# Patient Record
Sex: MALE | Race: BLACK OR AFRICAN AMERICAN | Employment: OTHER | URBAN - METROPOLITAN AREA
[De-identification: names, ages, dates, MRNs, and addresses within clinical notes are randomized per-mention and may not be internally consistent; named-entity substitution may affect disease eponyms.]

---

## 2019-01-14 NOTE — H&P
52 y.o. male for open access EGD for GERD. Additional data for completion of the targeted pre-endoscopy H&P will be provided under 'H&P interval notes'. Please see that document which will be attached to this.  
David Caballero MD

## 2019-01-15 ENCOUNTER — HOSPITAL ENCOUNTER (OUTPATIENT)
Age: 54
Setting detail: OUTPATIENT SURGERY
Discharge: HOME | End: 2019-01-15
Attending: SPECIALIST | Admitting: SPECIALIST
Payer: OTHER GOVERNMENT

## 2019-01-15 ENCOUNTER — ANESTHESIA (OUTPATIENT)
Dept: ENDOSCOPY | Age: 54
End: 2019-01-15
Payer: OTHER GOVERNMENT

## 2019-01-15 ENCOUNTER — ANESTHESIA EVENT (OUTPATIENT)
Dept: ENDOSCOPY | Age: 54
End: 2019-01-15
Payer: OTHER GOVERNMENT

## 2019-01-15 VITALS
SYSTOLIC BLOOD PRESSURE: 134 MMHG | WEIGHT: 174 LBS | RESPIRATION RATE: 16 BRPM | OXYGEN SATURATION: 100 % | DIASTOLIC BLOOD PRESSURE: 91 MMHG | HEART RATE: 102 BPM | HEIGHT: 66 IN | TEMPERATURE: 98.6 F | BODY MASS INDEX: 27.97 KG/M2

## 2019-01-15 PROCEDURE — 76040000019: Performed by: SPECIALIST

## 2019-01-15 PROCEDURE — 74011250636 HC RX REV CODE- 250/636: Performed by: PHYSICIAN ASSISTANT

## 2019-01-15 PROCEDURE — 77030009426 HC FCPS BIOP ENDOSC BSC -B: Performed by: SPECIALIST

## 2019-01-15 PROCEDURE — 88305 TISSUE EXAM BY PATHOLOGIST: CPT

## 2019-01-15 PROCEDURE — 76060000031 HC ANESTHESIA FIRST 0.5 HR: Performed by: SPECIALIST

## 2019-01-15 PROCEDURE — 74011250636 HC RX REV CODE- 250/636

## 2019-01-15 RX ORDER — GUAIFENESIN 600 MG/1
600 TABLET, EXTENDED RELEASE ORAL 2 TIMES DAILY
COMMUNITY

## 2019-01-15 RX ORDER — BUPROPION HYDROCHLORIDE 150 MG/1
150 TABLET ORAL
COMMUNITY

## 2019-01-15 RX ORDER — RANITIDINE 150 MG/1
150 CAPSULE ORAL 2 TIMES DAILY
COMMUNITY

## 2019-01-15 RX ORDER — SODIUM CHLORIDE 9 MG/ML
50 INJECTION, SOLUTION INTRAVENOUS CONTINUOUS
Status: DISCONTINUED | OUTPATIENT
Start: 2019-01-15 | End: 2019-01-15 | Stop reason: HOSPADM

## 2019-01-15 RX ORDER — MIRTAZAPINE 30 MG/1
TABLET, FILM COATED ORAL
COMMUNITY

## 2019-01-15 RX ORDER — PROPOFOL 10 MG/ML
INJECTION, EMULSION INTRAVENOUS
Status: DISCONTINUED | OUTPATIENT
Start: 2019-01-15 | End: 2019-01-15 | Stop reason: HOSPADM

## 2019-01-15 RX ORDER — LISINOPRIL AND HYDROCHLOROTHIAZIDE 12.5; 2 MG/1; MG/1
TABLET ORAL DAILY
COMMUNITY

## 2019-01-15 RX ORDER — MEGESTROL ACETATE 20 MG/1
TABLET ORAL DAILY
COMMUNITY

## 2019-01-15 RX ORDER — LIDOCAINE 50 MG/G
PATCH TOPICAL EVERY 24 HOURS
COMMUNITY

## 2019-01-15 RX ORDER — SILDENAFIL 100 MG/1
100 TABLET, FILM COATED ORAL AS NEEDED
COMMUNITY

## 2019-01-15 RX ORDER — ROSUVASTATIN CALCIUM 20 MG/1
20 TABLET, COATED ORAL
COMMUNITY

## 2019-01-15 RX ORDER — ALBUTEROL SULFATE 90 UG/1
AEROSOL, METERED RESPIRATORY (INHALATION)
COMMUNITY

## 2019-01-15 RX ORDER — CHLORZOXAZONE 500 MG/1
500 TABLET ORAL
COMMUNITY

## 2019-01-15 RX ORDER — PANTOPRAZOLE SODIUM 20 MG/1
20 TABLET, DELAYED RELEASE ORAL DAILY
COMMUNITY

## 2019-01-15 RX ORDER — EPINEPHRINE 0.1 MG/ML
1 INJECTION INTRACARDIAC; INTRAVENOUS
Status: DISCONTINUED | OUTPATIENT
Start: 2019-01-15 | End: 2019-01-15 | Stop reason: HOSPADM

## 2019-01-15 RX ORDER — PROPOFOL 10 MG/ML
INJECTION, EMULSION INTRAVENOUS AS NEEDED
Status: DISCONTINUED | OUTPATIENT
Start: 2019-01-15 | End: 2019-01-15 | Stop reason: HOSPADM

## 2019-01-15 RX ORDER — CARBOXYMETHYLCELLULOSE SODIUM 10 MG/ML
1 GEL OPHTHALMIC AS NEEDED
COMMUNITY

## 2019-01-15 RX ORDER — TRETINOIN 0.25 MG/G
CREAM TOPICAL
COMMUNITY

## 2019-01-15 RX ORDER — ATROPINE SULFATE 0.1 MG/ML
0.5 INJECTION INTRAVENOUS
Status: DISCONTINUED | OUTPATIENT
Start: 2019-01-15 | End: 2019-01-15 | Stop reason: HOSPADM

## 2019-01-15 RX ORDER — FLUTICASONE PROPIONATE AND SALMETEROL 250; 50 UG/1; UG/1
1 POWDER RESPIRATORY (INHALATION) EVERY 12 HOURS
COMMUNITY

## 2019-01-15 RX ORDER — DOCUSATE SODIUM 100 MG/1
100 CAPSULE, LIQUID FILLED ORAL 2 TIMES DAILY
COMMUNITY

## 2019-01-15 RX ORDER — NORTRIPTYLINE HYDROCHLORIDE 25 MG/1
CAPSULE ORAL
COMMUNITY

## 2019-01-15 RX ORDER — GABAPENTIN 300 MG/1
300 CAPSULE ORAL 3 TIMES DAILY
COMMUNITY

## 2019-01-15 RX ORDER — MELOXICAM 15 MG/1
15 TABLET ORAL DAILY
COMMUNITY

## 2019-01-15 RX ORDER — FENTANYL CITRATE 50 UG/ML
25 INJECTION, SOLUTION INTRAMUSCULAR; INTRAVENOUS AS NEEDED
Status: DISCONTINUED | OUTPATIENT
Start: 2019-01-15 | End: 2019-01-15 | Stop reason: HOSPADM

## 2019-01-15 RX ORDER — MAG HYDROX/ALUMINUM HYD/SIMETH 200-200-20
30 SUSPENSION, ORAL (FINAL DOSE FORM) ORAL
COMMUNITY

## 2019-01-15 RX ORDER — CETIRIZINE HCL 10 MG
TABLET ORAL
COMMUNITY

## 2019-01-15 RX ORDER — POLYETHYLENE GLYCOL 3350 17 G/17G
17 POWDER, FOR SOLUTION ORAL DAILY
COMMUNITY

## 2019-01-15 RX ORDER — DEXTROMETHORPHAN/PSEUDOEPHED 2.5-7.5/.8
1.2 DROPS ORAL
Status: DISCONTINUED | OUTPATIENT
Start: 2019-01-15 | End: 2019-01-15 | Stop reason: HOSPADM

## 2019-01-15 RX ORDER — MODAFINIL 100 MG/1
TABLET ORAL
COMMUNITY

## 2019-01-15 RX ORDER — LIDOCAINE HYDROCHLORIDE 20 MG/ML
INJECTION, SOLUTION EPIDURAL; INFILTRATION; INTRACAUDAL; PERINEURAL AS NEEDED
Status: DISCONTINUED | OUTPATIENT
Start: 2019-01-15 | End: 2019-01-15 | Stop reason: HOSPADM

## 2019-01-15 RX ORDER — DESONIDE 0.5 MG/ML
LOTION TOPICAL 2 TIMES DAILY
COMMUNITY

## 2019-01-15 RX ORDER — ERGOCALCIFEROL 1.25 MG/1
50000 CAPSULE ORAL
COMMUNITY

## 2019-01-15 RX ORDER — ZOLPIDEM TARTRATE 12.5 MG/1
12.5 TABLET, FILM COATED, EXTENDED RELEASE ORAL
COMMUNITY

## 2019-01-15 RX ORDER — MIDAZOLAM HYDROCHLORIDE 1 MG/ML
.25-5 INJECTION, SOLUTION INTRAMUSCULAR; INTRAVENOUS AS NEEDED
Status: DISCONTINUED | OUTPATIENT
Start: 2019-01-15 | End: 2019-01-15 | Stop reason: HOSPADM

## 2019-01-15 RX ORDER — FLUMAZENIL 0.1 MG/ML
0.2 INJECTION INTRAVENOUS
Status: DISCONTINUED | OUTPATIENT
Start: 2019-01-15 | End: 2019-01-15 | Stop reason: HOSPADM

## 2019-01-15 RX ORDER — ACETAMINOPHEN 500 MG
TABLET ORAL
COMMUNITY

## 2019-01-15 RX ORDER — PRAZOSIN HYDROCHLORIDE 2 MG/1
2 CAPSULE ORAL
COMMUNITY

## 2019-01-15 RX ORDER — SODIUM CHLORIDE 9 MG/ML
INJECTION, SOLUTION INTRAVENOUS
Status: DISCONTINUED | OUTPATIENT
Start: 2019-01-15 | End: 2019-01-15 | Stop reason: HOSPADM

## 2019-01-15 RX ORDER — NALOXONE HYDROCHLORIDE 0.4 MG/ML
0.4 INJECTION, SOLUTION INTRAMUSCULAR; INTRAVENOUS; SUBCUTANEOUS
Status: DISCONTINUED | OUTPATIENT
Start: 2019-01-15 | End: 2019-01-15 | Stop reason: HOSPADM

## 2019-01-15 RX ADMIN — PROPOFOL 75 MG: 10 INJECTION, EMULSION INTRAVENOUS at 09:34

## 2019-01-15 RX ADMIN — SODIUM CHLORIDE 50 ML/HR: 900 INJECTION, SOLUTION INTRAVENOUS at 09:25

## 2019-01-15 RX ADMIN — LIDOCAINE HYDROCHLORIDE 20 MG: 20 INJECTION, SOLUTION EPIDURAL; INFILTRATION; INTRACAUDAL; PERINEURAL at 09:34

## 2019-01-15 RX ADMIN — SODIUM CHLORIDE: 9 INJECTION, SOLUTION INTRAVENOUS at 08:53

## 2019-01-15 RX ADMIN — PROPOFOL 140 MCG/KG/MIN: 10 INJECTION, EMULSION INTRAVENOUS at 09:34

## 2019-01-15 NOTE — PERIOP NOTES
Endoscope was pre-cleaned at bedside immediately following procedure by [facility].

## 2019-01-15 NOTE — ANESTHESIA PREPROCEDURE EVALUATION
Anesthetic History No history of anesthetic complications Review of Systems / Medical History Patient summary reviewed and pertinent labs reviewed Pulmonary Sleep apnea: CPAP Asthma : well controlled Comments: Seasonal allergies Neuro/Psych Psychiatric history Cardiovascular Hypertension Hyperlipidemia Exercise tolerance: >4 METS 
  
GI/Hepatic/Renal 
  
GERD Endo/Other Arthritis Other Findings Physical Exam 
 
Airway Mallampati: II 
TM Distance: > 6 cm Neck ROM: normal range of motion Cardiovascular Rhythm: regular Rate: normal 
 
 
 
 Dental 
 
Dentition: Upper dentition intact and Lower dentition intact Pulmonary Breath sounds clear to auscultation Abdominal 
 
 
 
 Other Findings Anesthetic Plan ASA: 2 Anesthesia type: MAC Induction: Intravenous Anesthetic plan and risks discussed with: Patient

## 2019-01-15 NOTE — PROCEDURES
[address], [place]  [phone]      January 15, 2019    Esophagogastroduodenoscopy (EGD) Procedure Note  Dorothy Vidal  : 1965  [facility] Medical Record Number: [id number]      Indications:    GERD  Referring Physician:  Luli Melgar MD  Anesthesia/Sedation:  Conscious sedation/deep sedation/monitored anesthesia -- see notes. Endoscopist:  Dr. Tadeo Saunders  Complications:  None  Estimated Blood Loss:  None    Permit:  The indications, risks, benefits and alternatives were reviewed with the patient or their decision maker who was provided an opportunity to ask questions and all questions were answered. The specific risks of esophagogastroduodenoscopy with conscious sedation were reviewed, including but not limited to anesthetic complication, bleeding, adverse drug reaction, missed lesion, infection, IV site reactions, and intestinal perforation which would lead to the need for surgical repair. Alternatives to EGD including radiographic imaging, observation without testing, or laboratory testing were reviewed as well as the limitations of those alternatives discussed. After considering the options and having all their questions answered, the patient or their decision maker provided both verbal and written consent to proceed. Procedure in Detail:  After obtaining informed consent, positioning of the patient in the left lateral decubitus position, and conduction of a pre-procedure pause or \"time out\" the endoscope was introduced into the mouth and advanced to the duodenum. A careful inspection was made, and findings or interventions are described below. Findings:   Esophagus:normal - cold forceps biopsies taken from mid esophagus for histology   Stomach: normal   Duodenum/jejunum: normal      Specimens: See above    Impression: Completely normal EGD, biopsies taken. Recommendations:  -Await pathology. Thank you for entrusting me with this patient's care. Please do not hesitate to contact me with any questions or if I can be of assistance with any of your other patients' GI needs. Signed By: Shalonda Heller MD                        January 15, 2019     Surgical assistant none. Implants none unless specified.

## 2019-01-15 NOTE — PROGRESS NOTES
Francis Cook 1965 
[id number] Situation: 
Verbal report received from: Glen Sofia Procedure: Procedure(s): ESOPHAGOGASTRODUODENOSCOPY (EGD) ESOPHAGOGASTRODUODENAL (EGD) BIOPSY Background: 
 
Preoperative diagnosis: CHRONIC CONSTIPATION, HEARTBURN, NAUSEA Postoperative diagnosis: Normal 
 
:  Dr. Constance Franks Assistant(s): Endoscopy RN-1: Bill Wolff RN; Branden Lawson RN Specimens:  
ID Type Source Tests Collected by Time Destination 1 : biopsy Preservative Esophagus, Mid  Chely Wolf MD 1/15/2019 4688 Pathology H. Pylori  no Assessment: 
Intra-procedure medications Anesthesia gave intra-procedure sedation and medications, see anesthesia flow sheet yes Intravenous fluids: NS@ [facility] Vital signs stable yes Abdominal assessment: round and soft   yes Recommendation: 
Discharge patient per MD order  yes. Return to floor  outpatient Family or Friend   Fiance Permission to share finding with family or friend yes

## 2019-01-15 NOTE — DISCHARGE INSTRUCTIONS
[address] [facility] J CARLOS Cutler MD  [phone]      January 15, 2019     Francis Marlow  YOB: 1965    ENDOSCOPY DISCHARGE INSTRUCTIONS    If there is redness at IV site you should apply warm compress to area. If redness or soreness persist contact Dr. Maria Eugenia Cutler' or your primary care doctor. Gaseous discomfort may develop, but walking, belching will help relieve this. You may not operate a vehicle for 12 hours  You may not operate machinery or dangerous appliances for rest of today  You may not drink alcoholic beverages for 12 hours  Avoid making any critical decisions for 24 hours    DIET:  You may resume your normal diet, but some patients find that heavy or large meals may lead to indigestion or vomiting. I suggest a light meal as first food intake. MEDICATIONS:  The use of some over-the-counter pain medication may lead to bleeding after biopsies or other procedures you may have had done. Tylenol (also called acetaminophen) is safe to take and will not lead to bleeding. Based on the procedure you had today you may not safely take aspirin or aspirin-like products for the next ten (10) days. ACTIVITY:  You may resume your normal household activities, but it is recommended that you spend the remainder of the day resting -  avoid any strenuous activity. CALL DR. Anna England' OFFICE IF:  Increasing pain, nausea, vomiting  Abdominal distension (swelling)  Significant new or increased bleeding (oral or rectal)  Fever/Chills  Chest pain/shortness of breath                   Additional instructions:   No aspirin 10 days  We found that the upper GI tract is completely healthy when looked at with the eye and endoscope but we took biopsies to have the esophagus tissue examined under the microscope to see if there are any changes to explain any ongoing symptoms.   Ms Andressa Salinas or my office staff will contact you with the biopsy results when available. It was an honor to be your doctor today. Please let me or my office staff know if you have any feedback about today's procedure.     Leanna Valenzuela MD

## 2019-01-15 NOTE — INTERVAL H&P NOTE
Pre-Endoscopy H&P Update Chief complaint/HPI/ROS:  The indication for the procedure, the patient's history and the patient's current medications are reviewed prior to the procedure and that data is reported on the H&P to which this document is attached. Any significant complaints with regard to organ systems will be noted, and if not mentioned then a review of systems is not contributory. Past Medical History:  
Diagnosis Date  Arthritis  Asthma  Cancer ([facility] [address] 2011 prostate  Chronic pain   
 lower back  GERD (gastroesophageal reflux disease)  Hypertension  Psychiatric disorder PTSD  Sleep apnea   
 uses c-pap Past Surgical History:  
Procedure Laterality Date  HX ORTHOPAEDIC    
 R knee  HX OTHER SURGICAL Back surgery 3/2018  HX PROSTATECTOMY [place] vasectomy Social  
Social History Tobacco Use  Smoking status: Never Smoker  Smokeless tobacco: Never Used Substance Use Topics  Alcohol use: Yes Comment: occationally History reviewed. No pertinent family history. No Known Allergies Prior to Admission Medications Prescriptions Last Dose Informant Patient Reported? Taking? Wheat Dextrin (BENEFIBER CLEAR) 3 gram/3.5 gram pwpk 1/14/2019 at Unknown time  Yes Yes Sig: Take  by mouth. acetaminophen (TYLENOL EXTRA STRENGTH) 500 mg tablet 1/8/2019 at Unknown time  Yes Yes Sig: Take  by mouth every six (6) hours as needed for Pain. albuterol (PROVENTIL HFA, VENTOLIN HFA, PROAIR HFA) 90 mcg/actuation inhaler 1/8/2019 at Unknown time  Yes Yes Sig: Take  by inhalation. alum-mag hydroxide-simeth (MAALOX ADVANCED) 200-200-20 mg/5 mL susp 1/8/2019 at Unknown time  Yes Yes Sig: Take 30 mL by mouth every four (4) hours as needed. buPROPion XL (WELLBUTRIN XL) 150 mg tablet 1/14/2019 at Unknown time  Yes Yes Sig: Take 150 mg by mouth every morning. carboxymethylcellulose sodium (REFRESH LIQUIGEL) 1 % dlgl ophthalmic solution 1/15/2019 at Unknown time  Yes Yes Si Drop as needed. cetaphil (CETAPHIL) topical cream 2019 at Unknown time  Yes Yes Sig: Apply  to affected area as needed for Dry Skin. cetirizine (ZYRTEC) 10 mg tablet 2019 at Unknown time  Yes Yes Sig: Take  by mouth. chlorzoxazone (PARAFON FORTE) 500 mg tablet 2019 at Unknown time  Yes Yes Sig: Take 500 mg by mouth. desonide (TRIDESILON) 0.05 % topical lotion 2019 at Unknown time  Yes Yes Sig: Apply  to affected area two (2) times a day. docusate sodium (COLACE) 100 mg capsule 2019 at Unknown time  Yes Yes Sig: Take 100 mg by mouth two (2) times a day. ergocalciferol (VITAMIN D2) 50,000 unit capsule 2019 at Unknown time  Yes Yes Sig: Take 50,000 Units by mouth. fluticasone (FLOVENT DISKUS) 50 mcg/actuation inhaler 2019 at Unknown time  Yes Yes Sig: Take  by inhalation. fluticasone-salmeterol (ADVAIR DISKUS) 250-50 mcg/dose diskus inhaler 2019 at Unknown time  Yes Yes Sig: Take 1 Puff by inhalation every twelve (12) hours. gabapentin (NEURONTIN) 300 mg capsule 2019 at Unknown time  Yes Yes Sig: Take 300 mg by mouth three (3) times daily. guaiFENesin ER (MUCINEX) 600 mg ER tablet 2019 at Unknown time  Yes Yes Sig: Take 600 mg by mouth two (2) times a day. lidocaine (LIDODERM) 5 % 2019 at Unknown time  Yes Yes Sig: by TransDERmal route every twenty-four (24) hours. Apply patch to the affected area for 12 hours a day and remove for 12 hours a day. lisinopril-hydroCHLOROthiazide (PRINZIDE, ZESTORETIC) 20-12.5 mg per tablet 1/15/2019 at Unknown time  Yes Yes Sig: Take  by mouth daily. megestrol (MEGACE) 20 mg tablet 2019 at Unknown time  Yes Yes Sig: Take  by mouth daily. meloxicam (MOBIC) 15 mg tablet 2019 at Unknown time  Yes Yes Sig: Take 15 mg by mouth daily. mirtazapine (REMERON) 30 mg tablet 2019 at Unknown time  Yes Yes Sig: Take  by mouth nightly. modafinil (PROVIGIL) 100 mg tablet 2019 at Unknown time  Yes Yes Sig: Take  by mouth every morning. nortriptyline (PAMELOR) 25 mg capsule 2019 at Unknown time  Yes Yes Sig: Take  by mouth nightly. pantoprazole (PROTONIX) 20 mg tablet 2019 at Unknown time  Yes Yes Sig: Take 20 mg by mouth daily. polyethylene glycol (MIRALAX) 17 gram/dose powder 2019 at Unknown time  Yes Yes Sig: Take 17 g by mouth daily. prazosin (MINIPRESS) 2 mg capsule 2019 at Unknown time  Yes Yes Sig: Take 2 mg by mouth nightly. raNITIdine hcl 150 mg capsule 2019 at Unknown time  Yes Yes Sig: Take 150 mg by mouth two (2) times a day. rosuvastatin (CRESTOR) 20 mg tablet 2019 at Unknown time  Yes Yes Sig: Take 20 mg by mouth nightly. sildenafil citrate (VIAGRA) 100 mg tablet 2019 at Unknown time  Yes Yes Sig: Take 100 mg by mouth as needed. sodium chloride (OCEAN) 0.65 % nasal squeeze bottle 2019 at Unknown time  Yes Yes Si Spray as needed for Congestion. tretinoin (RETIN-A) 0.025 % topical cream 2019 at Unknown time  Yes Yes Sig: Apply  to affected area nightly. zolpidem CR (AMBIEN CR) 12.5 mg tablet 2019 at Unknown time  Yes Yes Sig: Take 12.5 mg by mouth nightly as needed for Sleep. Facility-Administered Medications: None PHYSICAL EXAM:  The patient is examined immediately prior to the procedure. Visit Vitals BP (!) 171/97 Pulse (!) 111 Temp 98 °F (36.7 °C) Resp 14 Ht 5' 6\" (1.676 m) Wt 78.9 kg (174 lb) SpO2 98% BMI 28.08 kg/m² Gen: Appears comfortable, no distress. Pulm: comfortable respirations with no abnormal audible breath sounds HEART: well perfused, no abnormal audible heart sounds GI: abdomen flat.  
 
PLAN:  Informed consent discussion held, patient afforded an opportunity to ask questions and all questions answered. After being advised of the risks, benefits, and alternatives, the patient requested that we proceed and indicated so on a written consent form. Will proceed with procedure as planned.  
Yola Ayala MD

## 2019-01-15 NOTE — ANESTHESIA POSTPROCEDURE EVALUATION
Procedure(s): ESOPHAGOGASTRODUODENOSCOPY (EGD) ESOPHAGOGASTRODUODENAL (EGD) BIOPSY. Anesthesia Post Evaluation Multimodal analgesia: multimodal analgesia used between 6 hours prior to anesthesia start to PACU discharge Patient location during evaluation: bedside Patient participation: complete - patient participated Level of consciousness: awake Pain management: adequate Airway patency: patent Anesthetic complications: no 
Cardiovascular status: acceptable Respiratory status: acceptable Hydration status: acceptable Visit Vitals BP (!) 134/91 Pulse (!) 102 Temp 37 °C (98.6 °F) Resp 16 Ht 5' 6\" (1.676 m) Wt 78.9 kg (174 lb) SpO2 100% BMI 28.08 kg/m²

## 2019-03-28 ENCOUNTER — ED HISTORICAL/CONVERTED ENCOUNTER (OUTPATIENT)
Dept: OTHER | Age: 54
End: 2019-03-28

## 2019-06-28 ENCOUNTER — ED HISTORICAL/CONVERTED ENCOUNTER (OUTPATIENT)
Dept: OTHER | Age: 54
End: 2019-06-28

## 2019-09-13 ENCOUNTER — HOSPITAL ENCOUNTER (OUTPATIENT)
Dept: GENERAL RADIOLOGY | Age: 54
Discharge: HOME | End: 2019-09-13
Attending: SPECIALIST
Payer: OTHER GOVERNMENT

## 2019-09-13 DIAGNOSIS — R10.84 GENERALIZED ABDOMINAL PAIN: ICD-10-CM

## 2019-09-13 PROCEDURE — 74280 X-RAY XM COLON 2CNTRST STD: CPT

## 2019-09-13 PROCEDURE — 77030032790 HC SOL CNTRST ORL POLBAR

## 2019-09-19 ENCOUNTER — HOSPITAL ENCOUNTER (OUTPATIENT)
Dept: GENERAL RADIOLOGY | Age: 54
Discharge: HOME | End: 2019-09-19
Attending: SPECIALIST
Payer: OTHER GOVERNMENT

## 2019-09-19 DIAGNOSIS — R10.84 GENERALIZED ABDOMINAL PAIN: ICD-10-CM

## 2019-09-19 PROCEDURE — 74241 XR UPPER GI SERIES W KUB: CPT

## 2020-02-28 ENCOUNTER — ANESTHESIA EVENT (OUTPATIENT)
Dept: ENDOSCOPY | Age: 55
End: 2020-02-28
Payer: OTHER GOVERNMENT

## 2020-02-28 ENCOUNTER — HOSPITAL ENCOUNTER (OUTPATIENT)
Age: 55
Setting detail: OUTPATIENT SURGERY
Discharge: HOME | End: 2020-02-28
Attending: SPECIALIST | Admitting: SPECIALIST
Payer: OTHER GOVERNMENT

## 2020-02-28 ENCOUNTER — ANESTHESIA (OUTPATIENT)
Dept: ENDOSCOPY | Age: 55
End: 2020-02-28
Payer: OTHER GOVERNMENT

## 2020-02-28 VITALS
BODY MASS INDEX: 27 KG/M2 | WEIGHT: 167.99 LBS | RESPIRATION RATE: 13 BRPM | HEIGHT: 66 IN | HEART RATE: 97 BPM | SYSTOLIC BLOOD PRESSURE: 133 MMHG | TEMPERATURE: 97.9 F | DIASTOLIC BLOOD PRESSURE: 92 MMHG | OXYGEN SATURATION: 100 %

## 2020-02-28 PROCEDURE — 74011000250 HC RX REV CODE- 250: Performed by: NURSE ANESTHETIST, CERTIFIED REGISTERED

## 2020-02-28 PROCEDURE — 74011250636 HC RX REV CODE- 250/636: Performed by: NURSE ANESTHETIST, CERTIFIED REGISTERED

## 2020-02-28 PROCEDURE — 76040000019: Performed by: SPECIALIST

## 2020-02-28 PROCEDURE — 76060000031 HC ANESTHESIA FIRST 0.5 HR: Performed by: SPECIALIST

## 2020-02-28 PROCEDURE — 74011250636 HC RX REV CODE- 250/636: Performed by: SPECIALIST

## 2020-02-28 PROCEDURE — 77030013992 HC SNR POLYP ENDOSC BSC -B: Performed by: SPECIALIST

## 2020-02-28 PROCEDURE — 88305 TISSUE EXAM BY PATHOLOGIST: CPT

## 2020-02-28 RX ORDER — FLUMAZENIL 0.1 MG/ML
0.2 INJECTION INTRAVENOUS
Status: DISCONTINUED | OUTPATIENT
Start: 2020-02-28 | End: 2020-02-28 | Stop reason: HOSPADM

## 2020-02-28 RX ORDER — PROPOFOL 10 MG/ML
INJECTION, EMULSION INTRAVENOUS AS NEEDED
Status: DISCONTINUED | OUTPATIENT
Start: 2020-02-28 | End: 2020-02-28 | Stop reason: HOSPADM

## 2020-02-28 RX ORDER — MIDAZOLAM HYDROCHLORIDE 1 MG/ML
.25-5 INJECTION, SOLUTION INTRAMUSCULAR; INTRAVENOUS AS NEEDED
Status: DISCONTINUED | OUTPATIENT
Start: 2020-02-28 | End: 2020-02-28 | Stop reason: HOSPADM

## 2020-02-28 RX ORDER — DEXTROMETHORPHAN/PSEUDOEPHED 2.5-7.5/.8
1.2 DROPS ORAL
Status: DISCONTINUED | OUTPATIENT
Start: 2020-02-28 | End: 2020-02-28 | Stop reason: HOSPADM

## 2020-02-28 RX ORDER — LIDOCAINE HYDROCHLORIDE 20 MG/ML
INJECTION, SOLUTION EPIDURAL; INFILTRATION; INTRACAUDAL; PERINEURAL AS NEEDED
Status: DISCONTINUED | OUTPATIENT
Start: 2020-02-28 | End: 2020-02-28 | Stop reason: HOSPADM

## 2020-02-28 RX ORDER — SODIUM CHLORIDE 9 MG/ML
50 INJECTION, SOLUTION INTRAVENOUS CONTINUOUS
Status: DISCONTINUED | OUTPATIENT
Start: 2020-02-28 | End: 2020-02-28 | Stop reason: HOSPADM

## 2020-02-28 RX ORDER — FENTANYL CITRATE 50 UG/ML
25 INJECTION, SOLUTION INTRAMUSCULAR; INTRAVENOUS AS NEEDED
Status: DISCONTINUED | OUTPATIENT
Start: 2020-02-28 | End: 2020-02-28 | Stop reason: HOSPADM

## 2020-02-28 RX ORDER — SODIUM CHLORIDE 9 MG/ML
INJECTION, SOLUTION INTRAVENOUS
Status: DISCONTINUED | OUTPATIENT
Start: 2020-02-28 | End: 2020-02-28 | Stop reason: HOSPADM

## 2020-02-28 RX ORDER — NALOXONE HYDROCHLORIDE 0.4 MG/ML
0.4 INJECTION, SOLUTION INTRAMUSCULAR; INTRAVENOUS; SUBCUTANEOUS
Status: DISCONTINUED | OUTPATIENT
Start: 2020-02-28 | End: 2020-02-28 | Stop reason: HOSPADM

## 2020-02-28 RX ADMIN — PROPOFOL 50 MG: 10 INJECTION, EMULSION INTRAVENOUS at 11:40

## 2020-02-28 RX ADMIN — SODIUM CHLORIDE 50 ML/HR: 900 INJECTION, SOLUTION INTRAVENOUS at 09:06

## 2020-02-28 RX ADMIN — LIDOCAINE HYDROCHLORIDE 60 MG: 20 INJECTION, SOLUTION INTRAVENOUS at 11:38

## 2020-02-28 RX ADMIN — PROPOFOL 120 MG: 10 INJECTION, EMULSION INTRAVENOUS at 11:38

## 2020-02-28 RX ADMIN — SODIUM CHLORIDE: 9 INJECTION, SOLUTION INTRAVENOUS at 11:36

## 2020-02-28 RX ADMIN — PROPOFOL 50 MG: 10 INJECTION, EMULSION INTRAVENOUS at 11:43

## 2020-02-28 NOTE — INTERVAL H&P NOTE
Pre-Endoscopy H&P Update Chief complaint/HPI/ROS:  The indication for the procedure, the patient's history and the patient's current medications are reviewed prior to the procedure and that data is reported on the H&P to which this document is attached. Any significant complaints with regard to organ systems will be noted, and if not mentioned then a review of systems is not contributory. Past Medical History:  
Diagnosis Date  Arthritis  Asthma  Cancer ([facility] [address] 2011 prostate  Chronic pain   
 lower back  GERD (gastroesophageal reflux disease)  Hypertension  Psychiatric disorder PTSD  Sleep apnea   
 uses c-pap Past Surgical History:  
Procedure Laterality Date  HX ORTHOPAEDIC    
 R knee  HX OTHER SURGICAL Back surgery 3/2018  HX PROSTATECTOMY [place] vasectomy Social  
Social History Tobacco Use  Smoking status: Never Smoker  Smokeless tobacco: Never Used Substance Use Topics  Alcohol use: Yes Comment: occationally History reviewed. No pertinent family history. No Known Allergies Prior to Admission Medications Prescriptions Last Dose Informant Patient Reported? Taking? Wheat Dextrin (BENEFIBER CLEAR) 3 gram/3.5 gram pwpk 2/27/2020 at Unknown time  Yes Yes Sig: Take  by mouth. acetaminophen (TYLENOL EXTRA STRENGTH) 500 mg tablet 1/28/2020 at prn   Yes Yes Sig: Take  by mouth every six (6) hours as needed for Pain. albuterol (PROVENTIL HFA, VENTOLIN HFA, PROAIR HFA) 90 mcg/actuation inhaler 2/26/2020  Yes No  
Sig: Take  by inhalation. alum-mag hydroxide-simeth (MAALOX ADVANCED) 200-200-20 mg/5 mL susp 2/27/2020 at Unknown time  Yes Yes Sig: Take 30 mL by mouth every four (4) hours as needed. buPROPion XL (WELLBUTRIN XL) 150 mg tablet 2/27/2020 at Unknown time  Yes Yes Sig: Take 150 mg by mouth every morning.   
carboxymethylcellulose sodium (REFRESH LIQUIGEL) 1 % dlgl ophthalmic solution 2020 at Unknown time  Yes Yes Si Drop as needed. cetaphil (CETAPHIL) topical cream 2020  Yes No  
Sig: Apply  to affected area as needed for Dry Skin. cetirizine (ZYRTEC) 10 mg tablet 2020 at Unknown time  Yes Yes Sig: Take  by mouth. chlorzoxazone (PARAFON FORTE) 500 mg tablet 2020 at Unknown time  Yes Yes Sig: Take 500 mg by mouth. desonide (TRIDESILON) 0.05 % topical lotion 2020  Yes No  
Sig: Apply  to affected area two (2) times a day. docusate sodium (COLACE) 100 mg capsule 2020 at Unknown time  Yes Yes Sig: Take 100 mg by mouth two (2) times a day. ergocalciferol (VITAMIN D2) 50,000 unit capsule 2020 at Unknown time  Yes Yes Sig: Take 50,000 Units by mouth. fluticasone (FLOVENT DISKUS) 50 mcg/actuation inhaler 2020 at Unknown time  Yes Yes Sig: Take  by inhalation. fluticasone-salmeterol (ADVAIR DISKUS) 250-50 mcg/dose diskus inhaler 2020  Yes No  
Sig: Take 1 Puff by inhalation every twelve (12) hours. gabapentin (NEURONTIN) 300 mg capsule 2020 at Unknown time  Yes Yes Sig: Take 300 mg by mouth three (3) times daily. guaiFENesin ER (MUCINEX) 600 mg ER tablet 2020 at Unknown time  Yes Yes Sig: Take 600 mg by mouth two (2) times a day. lidocaine (LIDODERM) 5 % 2020 at Unknown time  Yes Yes Sig: by TransDERmal route every twenty-four (24) hours. Apply patch to the affected area for 12 hours a day and remove for 12 hours a day. lisinopril-hydroCHLOROthiazide (PRINZIDE, ZESTORETIC) 20-12.5 mg per tablet 2020 at Unknown time  Yes Yes Sig: Take  by mouth daily. megestrol (MEGACE) 20 mg tablet 2020 at Unknown time  Yes Yes Sig: Take  by mouth daily. meloxicam (MOBIC) 15 mg tablet 2020 at Unknown time  Yes Yes Sig: Take 15 mg by mouth daily. mirtazapine (REMERON) 30 mg tablet 2020 at Unknown time  Yes Yes Sig: Take  by mouth nightly. modafinil (PROVIGIL) 100 mg tablet 2020 at Unknown time  Yes Yes Sig: Take  by mouth every morning. nortriptyline (PAMELOR) 25 mg capsule 2020  Yes No  
Sig: Take  by mouth nightly. pantoprazole (PROTONIX) 20 mg tablet 2020 at Unknown time  Yes Yes Sig: Take 20 mg by mouth daily. polyethylene glycol (MIRALAX) 17 gram/dose powder 2020 at Unknown time  Yes Yes Sig: Take 17 g by mouth daily. prazosin (MINIPRESS) 2 mg capsule 2020 at Unknown time  Yes Yes Sig: Take 2 mg by mouth nightly. raNITIdine hcl 150 mg capsule 2020 at Unknown time  Yes Yes Sig: Take 150 mg by mouth two (2) times a day. rosuvastatin (CRESTOR) 20 mg tablet 2020 at Unknown time  Yes Yes Sig: Take 20 mg by mouth nightly. sildenafil citrate (VIAGRA) 100 mg tablet 2020 at Unknown time  Yes Yes Sig: Take 100 mg by mouth as needed. sodium chloride (OCEAN) 0.65 % nasal squeeze bottle 2020 at Unknown time  Yes Yes Si Spray as needed for Congestion. tretinoin (RETIN-A) 0.025 % topical cream 2020  Yes No  
Sig: Apply  to affected area nightly. zolpidem CR (AMBIEN CR) 12.5 mg tablet 2020 at Unknown time  Yes Yes Sig: Take 12.5 mg by mouth nightly as needed for Sleep. Facility-Administered Medications: None PHYSICAL EXAM:  The patient is examined immediately prior to the procedure. Visit Vitals /85 Pulse (!) 102 Temp 98.2 °F (36.8 °C) Resp 14 Ht 5' 6\" (1.676 m) Wt 76.2 kg (167 lb 15.9 oz) SpO2 97% BMI 27.11 kg/m² Gen: Appears comfortable, no distress. Pulm: comfortable respirations with no abnormal audible breath sounds HEART: well perfused, no abnormal audible heart sounds GI: abdomen flat. PLAN:  Informed consent discussion held, patient afforded an opportunity to ask questions and all questions answered.   After being advised of the risks, benefits, and alternatives, the patient requested that we proceed and indicated so on a written consent form. Will proceed with procedure as planned. Anil Mack MD 
 
 
Constipation? Stool every 2-3 days with 12 months of complaints LLQ pain and has had CT and radiographs without abnormalities. DUe for screening colonoscopy as well.

## 2020-02-28 NOTE — PERIOP NOTES
7891- Patient stated he drank two, 16 ounce bottles of water. He said he finished drinking water 30 minutes ago. Anesthesia made aware and pushed back the procedure a couple of hours. Patient made aware and agreed to stay. 1210- Patient resting comfortably on stretcher, HOB elevated, VS stable, call bell within reach, Robyn davalos) at bedside, waiting for procedure start, will continue to monitor pt.      1133- Patient will be monitored and sedated by anesthesia for their procedure. See anesthesia note. 1151- Endoscope was pre-cleaned at bedside immediately following procedure by jessica Rasheed. 1155- Patient tolerated procedure without problems. Abdomen soft and patient arousable and voices no complaints Report received from CRNA, see anesthesia note. Patient transported to endoscopy recovery area via stretcher, report given to Maliha Chen RN.

## 2020-02-28 NOTE — PERIOP NOTES
Patient medication teaching was done for Tisha. [address] were included with discharge instructions. Opportunities for questions given and clarification was provided. patient and fiance verbalized understanding and acceptance. Medication side effects guide given and reviewed.

## 2020-02-28 NOTE — ROUTINE PROCESS
Francis Marlow 1965 
[id number] Situation: 
Verbal report received from: Catina Ivey Procedure: Procedure(s): 
COLONOSCOPY 
ENDOSCOPIC POLYPECTOMY Background: 
 
Preoperative diagnosis: SCREENING COLONOSCOPY (SCREENING FOR COLONIC NEOPLASIA) Postoperative diagnosis: sigmoid colon polyp, hemorrhoids :  Dr. Jose Alfredo Cody Assistant(s): Endoscopy Technician-1: Navin Mckeon Endoscopy RN-1: Corbin Tatum Specimens:  
ID Type Source Tests Collected by Time Destination 1 : Sigmoid colon polyp  Preservative   Vicente Gore MD 2/28/2020 1149 Pathology H. Pylori  no Assessment: 
Intra-procedure medications Anesthesia gave intra-procedure sedation and medications, see anesthesia flow sheet yes Intravenous fluids: NS@ [facility] Vital signs stable Abdominal assessment: round and soft Recommendation: 
Discharge patient per MD orde. Family or Friend Permission to share finding with family or friend yes

## 2020-02-28 NOTE — DISCHARGE INSTRUCTIONS
[address] [facility] J CARLOS Jasso MD  [phone]      February 28, 2020    Francis Marlow  YOB: 1965    COLONOSCOPY DISCHARGE INSTRUCTIONS    If there is redness at IV site you should apply warm compress to area. If redness or soreness persist contact Dr. Yannick Jasso' or your primary care doctor. There may be a slight amount of blood passed from the rectum. Gaseous discomfort may develop, but walking, belching will help relieve this. You may not operate a vehicle for 12 hours  You may not operate machinery or dangerous appliances for rest of today  You may not drink alcoholic beverages for 12 hours  Avoid making any critical decisions for 24 hours    DIET:  You may resume your normal diet, but some patients find that heavy or large meals may lead to indigestion or vomiting. I suggest a light meal as first food intake. MEDICATIONS:  The use of some over-the-counter pain medication may lead to bleeding after colon biopsies or polyp removal.  Tylenol (also called acetaminophen) is safe to take even if you have had colonoscopy with polyp removal.  Based on the procedure you had today you may not safely take aspirin or aspirin-like products for the next ten (10) days. Remember that Tylenol (also called acetaminophen) is safe to take after colonoscopy even if you have had biopsies or polyps removed. ACTIVITY:  You may resume your normal household activities, but it is recommended that you spend the remainder of the day resting -  avoid any strenuous activity. CALL DR. Prerna Bautista' OFFICE IF:  Increasing pain, nausea, vomiting  Abdominal distension (swelling)  Significant new or increased bleeding (oral or rectal)  Fever/Chills  Chest pain/shortness of breath                       Additional instructions:   No aspirin 10 days   We found one small polyp which I removed.   I see no abnormality in the colon which would explain the pain you've had. It seemed worse when you were lying on left side, this might suggest some problem with the abdominal muscles as an explanation. Physical therapy evaluation might help if that is the problem. Also, given your bowel pattern the pain might be irritable bowel syndrome. There is a medication called linzess which has been shown to improve abdominal pain in patients with IBS. I have written a prescription for that medication for you to try, and I'd suggest if you want to try it to take it for 8 weeks because it might take that long for the medication to reach its maximum effect. It was an honor to be your doctor today. Please let me or my office staff know if you have any feedback about today's procedure. Shantell Laird MD    Colonoscopy saves lives, and can prevent colon cancer. Everyone aged 48 or older needs colonoscopy.   Tell your family and friends: get the test!

## 2020-02-28 NOTE — ANESTHESIA PREPROCEDURE EVALUATION
Anesthetic History   No history of anesthetic complications            Review of Systems / Medical History  Patient summary reviewed and pertinent labs reviewed    Pulmonary        Sleep apnea: CPAP    Asthma : well controlled    Comments: Seasonal allergies   Neuro/Psych         Psychiatric history     Cardiovascular    Hypertension          Hyperlipidemia    Exercise tolerance: >4 METS     GI/Hepatic/Renal     GERD           Endo/Other        Arthritis     Other Findings              Physical Exam    Airway  Mallampati: II  TM Distance: > 6 cm  Neck ROM: normal range of motion        Cardiovascular    Rhythm: regular  Rate: normal         Dental    Dentition: Upper dentition intact and Lower dentition intact     Pulmonary  Breath sounds clear to auscultation               Abdominal         Other Findings            Anesthetic Plan    ASA: 3  Anesthesia type: MAC          Induction: Intravenous  Anesthetic plan and risks discussed with: Patient      Has been drinking water this morning, will consider him npo around 11am

## 2020-02-28 NOTE — ANESTHESIA POSTPROCEDURE EVALUATION
Procedure(s):  COLONOSCOPY  ENDOSCOPIC POLYPECTOMY. MAC    Anesthesia Post Evaluation        Patient location during evaluation: PACU  Level of consciousness: awake  Pain management: adequate  Airway patency: patent  Anesthetic complications: no  Cardiovascular status: acceptable  Respiratory status: acceptable  Hydration status: acceptable  Post anesthesia nausea and vomiting:  none      Vitals Value Taken Time   /90 2/28/2020 12:21 PM   Temp 36.6 °C (97.9 °F) 2/28/2020 12:00 PM   Pulse 97 2/28/2020 12:23 PM   Resp 13 2/28/2020 12:23 PM   SpO2 100 % 2/28/2020 12:23 PM   Vitals shown include unvalidated device data.

## 2020-02-28 NOTE — PROCEDURES
[facility] J CARLOS Farias MD  [phone]      2020    Colonoscopy Procedure Note  Simon Found  :  1965  [place] Medical Record Number: [id number]    Indications:     Screening colonoscopy, LLQ discomfort for a year and some constipation  PCP:  Rogelio Kruse MD  Anesthesia/Sedation: Conscious Sedation/Moderate Sedation/GETA, see notes  Endoscopist:  Dr. Marjorie Romero  Complications:  None  Estimated Blood Loss:  None    Permit:  The indications, risks, benefits and alternatives were reviewed with the patient or their decision maker who was provided an opportunity to ask questions and all questions were answered. The specific risks of colonoscopy with conscious sedation were reviewed, including but not limited to anesthetic complication, bleeding, adverse drug reaction, missed lesion, infection, IV site reactions, and intestinal perforation which would lead to the need for surgical repair. Alternatives to colonoscopy including radiographic imaging, observation without testing, or laboratory testing were reviewed including the limitations of those alternatives. After considering the options and having all their questions answered, the patient or their decision maker provided both verbal and written consent to proceed. Procedure in Detail:  After obtaining informed consent, positioning of the patient in the left lateral decubitus position, and conduction of a pre-procedure pause or \"time out\" the endoscope was introduced into the anus and advanced to the cecum, which was identified by the ileocecal valve and appendiceal orifice. The quality of the colonic preparation was good. A careful inspection was made as the colonoscope was withdrawn, findings and interventions are described below. Findings:   4mm polyp in the sigmoid colon removed with cold snare and all samples retrieved. Hemostasis confirmed. In the rectum, medium internal hemorrhoids are noted without bleeding. Specimens:    See above    Complications:   None; patient tolerated the procedure well. Impression:  Small colon polyp and hemorrhoids. No abnormality to explain LLQ pain, and he reports already having had cross sectional imaging. Recommendations:     - Await pathology. - Try treatment for IBS-C with linzess for constipation with abdominal pain, if no relief then consider a musculoskeletal explanation for symptoms as he indicated prior to sedation that lying on left side made the discomfort worse. Thank you for entrusting me with this patient's care. Please do not hesitate to contact me with any questions or if I can be of assistance with any of your other patients' GI needs. Signed By: Lon Lockhart MD                        February 28, 2020      Surgical assistant none. Implants none unless specified.

## 2020-02-28 NOTE — H&P
62 y.o. male for open access colonoscopy for screening   Additional data for completion of the targeted pre-endoscopy H&P will be provided under 'H&P interval notes'. Please see that document which will be attached to this.   Frantz Frausto MD\

## 2020-11-20 ENCOUNTER — HOSPITAL ENCOUNTER (EMERGENCY)
Age: 55
Discharge: HOME | End: 2020-11-20
Attending: STUDENT IN AN ORGANIZED HEALTH CARE EDUCATION/TRAINING PROGRAM
Payer: OTHER GOVERNMENT

## 2020-11-20 ENCOUNTER — APPOINTMENT (OUTPATIENT)
Dept: GENERAL RADIOLOGY | Age: 55
End: 2020-11-20
Attending: STUDENT IN AN ORGANIZED HEALTH CARE EDUCATION/TRAINING PROGRAM
Payer: OTHER GOVERNMENT

## 2020-11-20 VITALS
HEIGHT: 66 IN | HEART RATE: 118 BPM | DIASTOLIC BLOOD PRESSURE: 92 MMHG | TEMPERATURE: 98.7 F | WEIGHT: 170 LBS | BODY MASS INDEX: 27.32 KG/M2 | RESPIRATION RATE: 15 BRPM | SYSTOLIC BLOOD PRESSURE: 136 MMHG | OXYGEN SATURATION: 97 %

## 2020-11-20 DIAGNOSIS — R06.02 SOB (SHORTNESS OF BREATH): Primary | ICD-10-CM

## 2020-11-20 LAB
ALBUMIN SERPL-MCNC: 4.2 G/DL (ref 3.5–5)
ALBUMIN/GLOB SERPL: 1.2 {RATIO} (ref 1.1–2.2)
ALP SERPL-CCNC: 69 U/L (ref 45–117)
ALT SERPL-CCNC: 33 U/L (ref 12–78)
ANION GAP SERPL CALC-SCNC: 5 MMOL/L (ref 5–15)
AST SERPL W P-5'-P-CCNC: 35 U/L (ref 15–37)
BASOPHILS # BLD: 0 K/UL (ref 0–0.1)
BASOPHILS NFR BLD: 0 % (ref 0–1)
BILIRUB SERPL-MCNC: 0.6 MG/DL (ref 0.2–1)
BUN SERPL-MCNC: 7 MG/DL (ref 6–20)
BUN/CREAT SERPL: 5 (ref 12–20)
CA-I BLD-MCNC: 9.5 MG/DL (ref 8.5–10.1)
CHLORIDE SERPL-SCNC: 103 MMOL/L (ref 97–108)
CO2 SERPL-SCNC: 30 MMOL/L (ref 21–32)
CREAT SERPL-MCNC: 1.42 MG/DL (ref 0.7–1.3)
D DIMER PPP FEU-MCNC: 0.28 UG/ML(FEU)
DIFFERENTIAL METHOD BLD: ABNORMAL
EOSINOPHIL # BLD: 0.1 K/UL (ref 0–0.4)
EOSINOPHIL NFR BLD: 1 % (ref 0–7)
ERYTHROCYTE [DISTWIDTH] IN BLOOD BY AUTOMATED COUNT: 14.9 % (ref 11.5–14.5)
GLOBULIN SER CALC-MCNC: 3.6 G/DL (ref 2–4)
GLUCOSE SERPL-MCNC: 113 MG/DL (ref 65–100)
HCT VFR BLD AUTO: 44.6 % (ref 36.6–50.3)
HGB BLD-MCNC: 15.3 G/DL (ref 12.1–17)
IMM GRANULOCYTES # BLD AUTO: 0 K/UL (ref 0–0.04)
IMM GRANULOCYTES NFR BLD AUTO: 0 % (ref 0–0.5)
LYMPHOCYTES # BLD: 2.6 K/UL (ref 0.8–3.5)
LYMPHOCYTES NFR BLD: 45 % (ref 12–49)
MCH RBC QN AUTO: 33.6 PG (ref 26–34)
MCHC RBC AUTO-ENTMCNC: 34.3 G/DL (ref 30–36.5)
MCV RBC AUTO: 98 FL (ref 80–99)
MONOCYTES # BLD: 0.6 K/UL (ref 0–1)
MONOCYTES NFR BLD: 10 % (ref 5–13)
NEUTS SEG # BLD: 2.5 K/UL (ref 1.8–8)
NEUTS SEG NFR BLD: 44 % (ref 32–75)
PLATELET # BLD AUTO: 286 K/UL (ref 150–400)
PMV BLD AUTO: 9.9 FL (ref 8.9–12.9)
POTASSIUM SERPL-SCNC: 3.1 MMOL/L (ref 3.5–5.1)
PROT SERPL-MCNC: 7.8 G/DL (ref 6.4–8.2)
RBC # BLD AUTO: 4.55 M/UL (ref 4.1–5.7)
SODIUM SERPL-SCNC: 138 MMOL/L (ref 136–145)
TROPONIN I SERPL-MCNC: <0.05 NG/ML
WBC # BLD AUTO: 5.8 K/UL (ref 4.1–11.1)

## 2020-11-20 PROCEDURE — 84484 ASSAY OF TROPONIN QUANT: CPT

## 2020-11-20 PROCEDURE — 99284 EMERGENCY DEPT VISIT MOD MDM: CPT

## 2020-11-20 PROCEDURE — 85379 FIBRIN DEGRADATION QUANT: CPT

## 2020-11-20 PROCEDURE — 80053 COMPREHEN METABOLIC PANEL: CPT

## 2020-11-20 PROCEDURE — 85025 COMPLETE CBC W/AUTO DIFF WBC: CPT

## 2020-11-20 PROCEDURE — 93005 ELECTROCARDIOGRAM TRACING: CPT

## 2020-11-20 PROCEDURE — 71045 X-RAY EXAM CHEST 1 VIEW: CPT

## 2020-11-20 PROCEDURE — 36415 COLL VENOUS BLD VENIPUNCTURE: CPT

## 2020-11-20 RX ORDER — PREDNISONE 20 MG/1
60 TABLET ORAL DAILY
Qty: 15 TAB | Refills: 0 | Status: SHIPPED | OUTPATIENT
Start: 2020-11-20 | End: 2020-11-25

## 2020-11-20 RX ORDER — PREDNISONE 20 MG/1
60 TABLET ORAL DAILY
Qty: 15 TAB | Refills: 0 | OUTPATIENT
Start: 2020-11-20 | End: 2020-11-20 | Stop reason: SDUPTHER

## 2020-11-21 NOTE — ED PROVIDER NOTES
EMERGENCY DEPARTMENT HISTORY AND PHYSICAL EXAM      Date: 11/20/2020  Patient Name: Bony Barclay.    History of Presenting Illness     Chief Complaint   Patient presents with    Shortness of Breath       History Provided By: Patient    HPI: Bony Barclay., 60 y.o. male with a past medical history significant asthma, htn presents to the ED with cc of shortness of breath. Patient states over the last 2 days have felt increasing shortness of breath. Patient denies any fevers chills, no chest pains, no cough, no sore throat or runny nose. Patient has had to use increased albuterol over the last few days. Denies any known sick contacts, no recent travel/surgery, no hx of DVT/PE, no cancer history. No LE swelling. There are no other complaints, changes, or physical findings at this time. PCP: Shaylee Espana MD    Current Outpatient Medications   Medication Sig Dispense Refill    predniSONE (DELTASONE) 20 mg tablet Take 60 mg by mouth daily for 5 days. 15 Tab 0    lisinopril-hydroCHLOROthiazide (PRINZIDE, ZESTORETIC) 20-12.5 mg per tablet Take  by mouth daily.  rosuvastatin (CRESTOR) 20 mg tablet Take 20 mg by mouth nightly.  lidocaine (LIDODERM) 5 % by TransDERmal route every twenty-four (24) hours. Apply patch to the affected area for 12 hours a day and remove for 12 hours a day.  chlorzoxazone (PARAFON FORTE) 500 mg tablet Take 500 mg by mouth.  meloxicam (MOBIC) 15 mg tablet Take 15 mg by mouth daily.  acetaminophen (TYLENOL EXTRA STRENGTH) 500 mg tablet Take  by mouth every six (6) hours as needed for Pain.  gabapentin (NEURONTIN) 300 mg capsule Take 300 mg by mouth three (3) times daily.  nortriptyline (PAMELOR) 25 mg capsule Take  by mouth nightly.  buPROPion XL (WELLBUTRIN XL) 150 mg tablet Take 150 mg by mouth every morning.  modafinil (PROVIGIL) 100 mg tablet Take  by mouth every morning.       zolpidem CR (AMBIEN CR) 12.5 mg tablet Take 12.5 mg by mouth nightly as needed for Sleep.  mirtazapine (REMERON) 30 mg tablet Take  by mouth nightly.  prazosin (MINIPRESS) 2 mg capsule Take 2 mg by mouth nightly.  fluticasone-salmeterol (ADVAIR DISKUS) 250-50 mcg/dose diskus inhaler Take 1 Puff by inhalation every twelve (12) hours.  albuterol (PROVENTIL HFA, VENTOLIN HFA, PROAIR HFA) 90 mcg/actuation inhaler Take  by inhalation.  fluticasone (FLOVENT DISKUS) 50 mcg/actuation inhaler Take  by inhalation.  cetirizine (ZYRTEC) 10 mg tablet Take  by mouth.  sodium chloride (OCEAN) 0.65 % nasal squeeze bottle 1 Spray as needed for Congestion.  carboxymethylcellulose sodium (REFRESH LIQUIGEL) 1 % dlgl ophthalmic solution 1 Drop as needed.  sildenafil citrate (VIAGRA) 100 mg tablet Take 100 mg by mouth as needed.  cetaphil (CETAPHIL) topical cream Apply  to affected area as needed for Dry Skin.  raNITIdine hcl 150 mg capsule Take 150 mg by mouth two (2) times a day.  pantoprazole (PROTONIX) 20 mg tablet Take 20 mg by mouth daily.  megestrol (MEGACE) 20 mg tablet Take  by mouth daily.  guaiFENesin ER (MUCINEX) 600 mg ER tablet Take 600 mg by mouth two (2) times a day.  Wheat Dextrin (BENEFIBER CLEAR) 3 gram/3.5 gram pwpk Take  by mouth.  docusate sodium (COLACE) 100 mg capsule Take 100 mg by mouth two (2) times a day.  polyethylene glycol (MIRALAX) 17 gram/dose powder Take 17 g by mouth daily.  tretinoin (RETIN-A) 0.025 % topical cream Apply  to affected area nightly.  desonide (TRIDESILON) 0.05 % topical lotion Apply  to affected area two (2) times a day.  ergocalciferol (VITAMIN D2) 50,000 unit capsule Take 50,000 Units by mouth.  alum-mag hydroxide-simeth (MAALOX ADVANCED) 200-200-20 mg/5 mL susp Take 30 mL by mouth every four (4) hours as needed.          Past History     Past Medical History:  Past Medical History:   Diagnosis Date    Arthritis     Asthma     Cancer [facility])     2011 prostate    Chronic pain     lower back     GERD (gastroesophageal reflux disease)     Hypertension     Psychiatric disorder     PTSD    Sleep apnea     uses c-pap       Past Surgical History:  Past Surgical History:   Procedure Laterality Date    COLONOSCOPY N/A 2/28/2020    COLONOSCOPY performed by Jaquelin Cardona MD at [facility] 56 HX ORTHOPAEDIC      R knee    HX OTHER SURGICAL      Back surgery 3/2018    HX PROSTATECTOMY      HX UROLOGICAL      1990 vasectomy       Family History:  History reviewed. No pertinent family history. Social History:  Social History     Tobacco Use    Smoking status: Never Smoker    Smokeless tobacco: Never Used   Substance Use Topics    Alcohol use: Yes     Comment: occationally    Drug use: No       Allergies:  No Known Allergies      Review of Systems     Review of Systems   Constitutional: Negative for activity change, chills, fatigue and fever. HENT: Negative for congestion and sore throat. Eyes: Negative for photophobia and visual disturbance. Respiratory: Positive for cough, chest tightness and shortness of breath. Cardiovascular: Negative for chest pain and palpitations. Gastrointestinal: Negative for abdominal distention and abdominal pain. Genitourinary: Negative for dysuria. Musculoskeletal: Negative for arthralgias and myalgias. Neurological: Negative for dizziness, weakness and headaches. Physical Exam     Physical Exam  Vitals signs and nursing note reviewed. Constitutional:       General: He is not in acute distress. Appearance: He is well-developed and normal weight. He is not ill-appearing. HENT:      Head: Normocephalic and atraumatic. Mouth/Throat:      Mouth: Mucous membranes are moist.      Pharynx: Oropharynx is clear. Eyes:      Extraocular Movements: Extraocular movements intact. Pupils: Pupils are equal, round, and reactive to light.    Neck:      Musculoskeletal: Normal range of motion and neck supple. Vascular: No JVD. Cardiovascular:      Rate and Rhythm: Regular rhythm. Tachycardia present. Pulmonary:      Effort: Pulmonary effort is normal.      Breath sounds: Normal breath sounds. Abdominal:      General: Bowel sounds are normal.      Palpations: Abdomen is soft. There is no hepatomegaly or mass. Tenderness: There is no abdominal tenderness. Musculoskeletal: Normal range of motion. Right lower leg: No edema. Left lower leg: No edema. Skin:     General: Skin is warm and dry. Capillary Refill: Capillary refill takes less than 2 seconds. Neurological:      General: No focal deficit present. Mental Status: He is alert and oriented to person, place, and time. Diagnostic Study Results     Labs -     Recent Results (from the past 12 hour(s))   CBC WITH AUTOMATED DIFF    Collection Time: 11/20/20  8:00 PM   Result Value Ref Range    WBC 5.8 4.1 - 11.1 K/uL    RBC 4.55 4.10 - 5.70 M/uL    HGB 15.3 12.1 - 17.0 g/dL    HCT 44.6 36.6 - 50.3 %    MCV 98.0 80.0 - 99.0 FL    MCH 33.6 26.0 - 34.0 PG    MCHC 34.3 30.0 - 36.5 g/dL    RDW 14.9 (H) 11.5 - 14.5 %    PLATELET 671 967 - 104 K/uL    MPV 9.9 8.9 - 12.9 FL    NEUTROPHILS 44 32 - 75 %    LYMPHOCYTES 45 12 - 49 %    MONOCYTES 10 5 - 13 %    EOSINOPHILS 1 0 - 7 %    BASOPHILS 0 0 - 1 %    IMMATURE GRANULOCYTES 0 0.0 - 0.5 %    ABS. NEUTROPHILS 2.5 1.8 - 8.0 K/UL    ABS. LYMPHOCYTES 2.6 0.8 - 3.5 K/UL    ABS. MONOCYTES 0.6 0.0 - 1.0 K/UL    ABS. EOSINOPHILS 0.1 0.0 - 0.4 K/UL    ABS. BASOPHILS 0.0 0.0 - 0.1 K/UL    ABS. IMM.  GRANS. 0.0 0.00 - 0.04 K/UL    DF AUTOMATED     METABOLIC PANEL, COMPREHENSIVE    Collection Time: 11/20/20  8:00 PM   Result Value Ref Range    Sodium 138 136 - 145 mmol/L    Potassium 3.1 (L) 3.5 - 5.1 mmol/L    Chloride 103 97 - 108 mmol/L    CO2 30 21 - 32 mmol/L    Anion gap 5 5 - 15 mmol/L    Glucose 113 (H) 65 - 100 mg/dL    BUN 7 6 - 20 mg/dL    Creatinine 1.42 (H) 0.70 - 1.30 mg/dL    BUN/Creatinine ratio 5 (L) 12 - 20      GFR est AA >60 >60 ml/min/1.73m2    GFR est non-AA 51 (L) >60 ml/min/1.73m2    Calcium 9.5 8.5 - 10.1 mg/dL    Bilirubin, total 0.6 0.2 - 1.0 mg/dL    AST (SGOT) 35 15 - 37 U/L    ALT (SGPT) 33 12 - 78 U/L    Alk. phosphatase 69 45 - 117 U/L    Protein, total 7.8 6.4 - 8.2 g/dL    Albumin 4.2 3.5 - 5.0 g/dL    Globulin 3.6 2.0 - 4.0 g/dL    A-G Ratio 1.2 1.1 - 2.2     TROPONIN I    Collection Time: 11/20/20  8:00 PM   Result Value Ref Range    Troponin-I, Qt. <0.05 <0.05 ng/mL   D DIMER    Collection Time: 11/20/20  8:00 PM   Result Value Ref Range    D DIMER 0.28 <0.50 ug/ml(FEU)       Radiologic Studies -   [unfilled]  CT Results  (Last 48 hours)    None        CXR Results  (Last 48 hours)               11/20/20 2019  XR CHEST PORT Final result    Impression:  IMPRESSION: No acute findings. Narrative:  Portable upright radiograph of the chest 8:13 PM compared to June 28, 2019. INDICATION: Chest pain. Heart size is within normal limits. No infiltrate or effusion. There is an old   healed fracture of left clavicle. No acute bony findings. No pneumothorax. Medical Decision Making and ED Course   I am the first provider for this patient. I reviewed the vital signs, available nursing notes, past medical history, past surgical history, family history and social history. Vital Signs-Reviewed the patient's vital signs. Patient Vitals for the past 12 hrs:   Temp Pulse Resp BP SpO2   11/20/20 2120 98.7 °F (37.1 °C) (!) 109 16 (!) 146/100 99 %   11/20/20 1946 99.2 °F (37.3 °C) (!) 121 20 (!) 158/99 99 %       EKG interpretation: (Preliminary)  Sinus tach 122, no st elevations, normal axis    Records Reviewed: Nursing Notes    The patient presents with chest tightness, shortness of breath with a differential diagnosis of asthma exacerbation, bronchitis, pneumonia, PE      Provider Notes (Medical Decision Making):      MDM 20-year-old male, past medical history of hypertension, asthma, presents to the emergency department chest tightness, shortness of breath. Patient slightly tachycardic on triage, saturating 99%, afebrile, normotensive    Clear breath sounds bilateral    We will draw basic lab work, chest x-ray, D-dimer, cardiac enzymes        ED Course:   Initial assessment performed. The patients presenting problems have been discussed, and they are in agreement with the care plan formulated and outlined with them. I have encouraged them to ask questions as they arise throughout their visit. ED Course as of Nov 20 2208   Nam Miller Nov 20, 2020 2157 Patient's lab work reviewed, cardiac enzymes negative, troponin negative, D-dimer within normal limits, low pretest probability for DVT/PE. Patient's chest x-ray shows no signs of pneumonia, no infiltrate or effusion. At this time patient's shortness of breath may be related to asthma, patient reassessed, acutely short of breath, vital signs stable, will discharge patient home, instructed to follow-up with primary care physician within the next week    [PZ]      ED Course User Index  [PZ] Yosef Banda MD         Procedures       Trish Bryant MD  Procedures         Disposition     Discharged home      Remove if not discharged  DISCHARGE PLAN:  1. Current Discharge Medication List      CONTINUE these medications which have NOT CHANGED    Details   lisinopril-hydroCHLOROthiazide (PRINZIDE, ZESTORETIC) 20-12.5 mg per tablet Take  by mouth daily. rosuvastatin (CRESTOR) 20 mg tablet Take 20 mg by mouth nightly. lidocaine (LIDODERM) 5 % by TransDERmal route every twenty-four (24) hours. Apply patch to the affected area for 12 hours a day and remove for 12 hours a day. chlorzoxazone (PARAFON FORTE) 500 mg tablet Take 500 mg by mouth.      meloxicam (MOBIC) 15 mg tablet Take 15 mg by mouth daily.       acetaminophen (TYLENOL EXTRA STRENGTH) 500 mg tablet Take by mouth every six (6) hours as needed for Pain.      gabapentin (NEURONTIN) 300 mg capsule Take 300 mg by mouth three (3) times daily. nortriptyline (PAMELOR) 25 mg capsule Take  by mouth nightly. buPROPion XL (WELLBUTRIN XL) 150 mg tablet Take 150 mg by mouth every morning. modafinil (PROVIGIL) 100 mg tablet Take  by mouth every morning. zolpidem CR (AMBIEN CR) 12.5 mg tablet Take 12.5 mg by mouth nightly as needed for Sleep.      mirtazapine (REMERON) 30 mg tablet Take  by mouth nightly. prazosin (MINIPRESS) 2 mg capsule Take 2 mg by mouth nightly. fluticasone-salmeterol (ADVAIR DISKUS) 250-50 mcg/dose diskus inhaler Take 1 Puff by inhalation every twelve (12) hours. albuterol (PROVENTIL HFA, VENTOLIN HFA, PROAIR HFA) 90 mcg/actuation inhaler Take  by inhalation. fluticasone (FLOVENT DISKUS) 50 mcg/actuation inhaler Take  by inhalation. cetirizine (ZYRTEC) 10 mg tablet Take  by mouth.      sodium chloride (OCEAN) 0.65 % nasal squeeze bottle 1 Spray as needed for Congestion. carboxymethylcellulose sodium (REFRESH LIQUIGEL) 1 % dlgl ophthalmic solution 1 Drop as needed. sildenafil citrate (VIAGRA) 100 mg tablet Take 100 mg by mouth as needed. cetaphil (CETAPHIL) topical cream Apply  to affected area as needed for Dry Skin. raNITIdine hcl 150 mg capsule Take 150 mg by mouth two (2) times a day. pantoprazole (PROTONIX) 20 mg tablet Take 20 mg by mouth daily. megestrol (MEGACE) 20 mg tablet Take  by mouth daily. guaiFENesin ER (MUCINEX) 600 mg ER tablet Take 600 mg by mouth two (2) times a day. Wheat Dextrin (BENEFIBER CLEAR) 3 gram/3.5 gram pwpk Take  by mouth. docusate sodium (COLACE) 100 mg capsule Take 100 mg by mouth two (2) times a day. polyethylene glycol (MIRALAX) 17 gram/dose powder Take 17 g by mouth daily. tretinoin (RETIN-A) 0.025 % topical cream Apply  to affected area nightly.       desonide (TRIDESILON) 0.05 % topical lotion Apply  to affected area two (2) times a day. ergocalciferol (VITAMIN D2) 50,000 unit capsule Take 50,000 Units by mouth. alum-mag hydroxide-simeth (MAALOX ADVANCED) 200-200-20 mg/5 mL susp Take 30 mL by mouth every four (4) hours as needed. 2.   Follow-up Information     Follow up With Specialties Details Why Contact Info    Lonnie Spain MD Family Medicine In 1 week  [facility]  [phone]          3. Return to ED if worse     Diagnosis     Clinical Impression:   1. SOB (shortness of breath)        Attestations:    Jaelyn Ray MD    Please note that this dictation was completed with ELIKE, the computer voice recognition software. Quite often unanticipated grammatical, syntax, homophones, and other interpretive errors are inadvertently transcribed by the computer software. Please disregard these errors. Please excuse any errors that have escaped final proofreading. Thank you.

## 2020-11-23 LAB
ATRIAL RATE: 122 BPM
CALCULATED P AXIS, ECG09: 61 DEGREES
CALCULATED R AXIS, ECG10: 5 DEGREES
CALCULATED T AXIS, ECG11: 53 DEGREES
DIAGNOSIS, 93000: NORMAL
P-R INTERVAL, ECG05: 154 MS
Q-T INTERVAL, ECG07: 326 MS
QRS DURATION, ECG06: 98 MS
QTC CALCULATION (BEZET), ECG08: 464 MS
VENTRICULAR RATE, ECG03: 122 BPM

## (undated) DEVICE — ADULT SPO2 SENSOR: Brand: NELLCOR

## (undated) DEVICE — FORCEPS BX L240CM JAW DIA2.8MM L CAP W/ NDL MIC MESH TOOTH

## (undated) DEVICE — SYR 5ML 1/5 GRAD LL NSAF LF --

## (undated) DEVICE — BAG SPEC BIOHZRD 10 X 10 IN --

## (undated) DEVICE — CUFF BLD PRSS AD SZ 11 FOR 25-34CM 1 TB TRIPURPOSE CONN

## (undated) DEVICE — Device

## (undated) DEVICE — CATH IV AUTOGRD BC BLU 22GA 25 -- INSYTE

## (undated) DEVICE — ELECTRODE,RADIOTRANSLUCENT,FOAM,3PK: Brand: MEDLINE

## (undated) DEVICE — NDL FLTR TIP 5 MIC 18GX1.5IN --

## (undated) DEVICE — SOLIDIFIER MEDC 1200ML -- CONVERT TO 356117

## (undated) DEVICE — CATH IV AUTOGRD BC PNK 20GA 25 -- INSYTE

## (undated) DEVICE — CONTAINER SPEC 20 ML LID NEUT BUFF FORMALIN 10 % POLYPR STS

## (undated) DEVICE — CANN NASAL O2 CAPNOGRAPHY AD -- FILTERLINE

## (undated) DEVICE — CANNULA CUSH AD W/ 14FT TBG

## (undated) DEVICE — KENDALL RADIOLUCENT FOAM MONITORING ELECTRODE -RECTANGULAR SHAPE: Brand: KENDALL

## (undated) DEVICE — BAG BELONG PT PERS CLEAR HANDL

## (undated) DEVICE — KIT COLON W/ 1.1OZ LUB AND 2 END

## (undated) DEVICE — BITEBLOCK ENDOSCP 60FR MAXI WHT POLYETH STURDY W/ VELC WVN

## (undated) DEVICE — 1200 GUARD II KIT W/5MM TUBE W/O VAC TUBE: Brand: GUARDIAN

## (undated) DEVICE — BASIN EMSIS 16OZ GRAPHITE PLAS KID SHP MOLD GRAD FOR ORAL

## (undated) DEVICE — SNARE ENDOSCP M L240CM W27MM SHTH DIA2.4MM CHN 2.8MM OVL

## (undated) DEVICE — POLYP TRAP: Brand: TRAPEASE®

## (undated) DEVICE — SYR 3ML LL TIP 1/10ML GRAD --

## (undated) DEVICE — SET GRAV CK VLV NEEDLESS ST 3 GANGED 4WAY STPCOCK HI FLO 10

## (undated) DEVICE — SET ADMIN 16ML TBNG L100IN 2 Y INJ SITE IV PIGGY BK DISP

## (undated) DEVICE — NDL PRT INJ NSAF BLNT 18GX1.5 --